# Patient Record
Sex: MALE | Race: BLACK OR AFRICAN AMERICAN | URBAN - METROPOLITAN AREA
[De-identification: names, ages, dates, MRNs, and addresses within clinical notes are randomized per-mention and may not be internally consistent; named-entity substitution may affect disease eponyms.]

---

## 2019-03-31 ENCOUNTER — EMERGENCY (EMERGENCY)
Facility: HOSPITAL | Age: 54
LOS: 1 days | Discharge: HOME | End: 2019-03-31
Admitting: EMERGENCY MEDICINE
Payer: COMMERCIAL

## 2019-03-31 VITALS
SYSTOLIC BLOOD PRESSURE: 145 MMHG | RESPIRATION RATE: 18 BRPM | HEART RATE: 100 BPM | TEMPERATURE: 98 F | WEIGHT: 291.01 LBS | DIASTOLIC BLOOD PRESSURE: 89 MMHG | HEIGHT: 70 IN | OXYGEN SATURATION: 97 %

## 2019-03-31 PROCEDURE — 73562 X-RAY EXAM OF KNEE 3: CPT | Mod: 26,50

## 2019-03-31 PROCEDURE — 73562 X-RAY EXAM OF KNEE 3: CPT

## 2019-03-31 PROCEDURE — 99283 EMERGENCY DEPT VISIT LOW MDM: CPT | Mod: 25

## 2019-03-31 RX ORDER — KETOROLAC TROMETHAMINE 30 MG/ML
60 SYRINGE (ML) INJECTION ONCE
Qty: 0 | Refills: 0 | Status: DISCONTINUED | OUTPATIENT
Start: 2019-03-31 | End: 2019-03-31

## 2019-03-31 RX ADMIN — Medication 60 MILLIGRAM(S): at 12:20

## 2019-03-31 NOTE — ED PROVIDER NOTE - CLINICAL SUMMARY MEDICAL DECISION MAKING FREE TEXT BOX
52 yo m with no pmh c/o b/l knee pain x 6 months. Pain getting worse. L knee worse than R. Denies trauma. Pain worse when going fro msitting to standing and when walking for long periods of time. Takes tylenol occasionally. Denies fever, chills, swelling, redness, calf pain. +tenderness to b/l knees, FROM, L knee + crepitus, no erythema, warmth or swelling, no calf swelling or tenderness 52 yo m with no pmh c/o b/l knee pain x 6 months. Pain getting worse. L knee worse than R. Denies trauma. Pain worse when going fro msitting to standing and when walking for long periods of time. Takes tylenol occasionally. Denies fever, chills, swelling, redness, calf pain. +tenderness to b/l knees, FROM, L knee + crepitus, no erythema, warmth or swelling, no calf swelling or tenderness. XR shows joint space narrowing, likely OA, will refer to ortho

## 2019-03-31 NOTE — ED ADULT TRIAGE NOTE - CHIEF COMPLAINT QUOTE
"I have been having pain to both my knees for about 6 months but it is so much worse now and my left hurts more".

## 2019-03-31 NOTE — ED PROVIDER NOTE - CARE PROVIDER_API CALL
Obinna Wolfe)  Orthopaedic Surgery  [address] [place] [address]  Phone: [phone]  Fax: [phone]  Follow Up Time:     Ricky Perez)  Orthopaedic Surgery  [address], [place] [address]  Phone: [phone]  Fax: [phone]  Follow Up Time:

## 2019-03-31 NOTE — ED PROVIDER NOTE - PHYSICAL EXAMINATION
CONSTITUTIONAL: Well-appearing; well-nourished; in no apparent distress.   HEAD: Normocephalic; atraumatic.   EYES: PERRL; EOM intact; conjunctiva and sclera clear  ENT: normal nose; no rhinorrhea; normal pharynx with no erythema or lesions.   NECK: Supple; non-tender;   CARDIOVASCULAR: Normal S1, S2; no murmurs, rubs, or gallops. Regular rate and rhythm.   RESPIRATORY: Breathing easily; breath sounds clear and equal bilaterally; no wheezes, rhonchi, or rales.  MSK: +tenderness to b/l knees, FROM, L knee + crepitus, no erythema, warmth or swelling, no calf swelling or tenderness   EXT: No cyanosis or edema; N/V intact  SKIN: Normal for age and race; warm; dry; good turgor; no apparent lesions or rash.

## 2019-03-31 NOTE — ED PROVIDER NOTE - OBJECTIVE STATEMENT
52 yo m with no pmh c/o b/l knee pain x 6 months. Pain getting worse. L knee worse than R. Denies trauma. Pain worse when going fro msitting to standing and when walking for long periods of time. Takes tylenol occasionally. Denies fever, chills, swelling, redness, calf pain.

## 2019-03-31 NOTE — ED PROVIDER NOTE - NSFOLLOWUPINSTRUCTIONS_ED_ALL_ED_FT
Osteoarthritis    WHAT YOU NEED TO KNOW:    Osteoarthritis (OA) occurs when cartilage (tissue that cushions a joint) wears away slowly and causes the bones to rub together. OA is a long-term condition that often affects the hands, neck, lower back, knees, and hips. OA is also called arthrosis or degenerative joint disease.    DISCHARGE INSTRUCTIONS:    Call your doctor or specialist if:     You have severe pain.      You cannot move your joint.      You have a fever.      Your joint is red and tender.      You have questions or concerns about your condition or care.    Medicines: You may need any of the following:     Acetaminophen decreases pain and fever. It is available without a doctor's order. Ask how much to take and how often to take it. Follow directions. Read the labels of all other medicines you are using to see if they also contain acetaminophen, or ask your doctor or pharmacist. Acetaminophen can cause liver damage if not taken correctly. Do not use more than 4 grams (4,000 milligrams) total of acetaminophen in one day.       NSAIDs, such as ibuprofen, help decrease swelling, pain, and fever. This medicine is available with or without a doctor's order. NSAIDs can cause stomach bleeding or kidney problems in certain people. If you take blood thinner medicine, always ask your healthcare provider if NSAIDs are safe for you. Always read the medicine label and follow directions.      Capsaicin cream may help decrease pain in your joint.       Prescription pain medicine may be given. Ask your healthcare provider how to take this medicine safely. Some prescription pain medicines contain acetaminophen. Do not take other medicines that contain acetaminophen without talking to your healthcare provider. Too much acetaminophen may cause liver damage. Prescription pain medicine may cause constipation. Ask your healthcare provider how to prevent or treat constipation.       Take your medicine as directed. Contact your healthcare provider if you think your medicine is not helping or if you have side effects. Tell him or her if you are allergic to any medicine. Keep a list of the medicines, vitamins, and herbs you take. Include the amounts, and when and why you take them. Bring the list or the pill bottles to follow-up visits. Carry your medicine list with you in case of an emergency.    Follow up with your healthcare provider as directed: Write down your questions so you remember to ask them during your visits.    Go to physical therapy as directed: A physical therapist teaches you exercises to help improve movement and strength, and to decrease pain in your joints. The exercises also help lower your risk for loss of function. A physical therapist may move an area with his or her hands. For example, he or she may move your leg in certain ways to treat osteoarthritis in your hip.    Manage your symptoms:     Stay active. Physical activity may reduce your pain and improve your ability to do daily activities. Avoid activities that cause pain. Ask your healthcare provider what type of exercise would be best for you.      Maintain a healthy weight. This helps decrease the strain on the joints in your back, hips, knees, ankles, and feet. Ask your healthcare provider what a healthy weight is for you. He or she can help you create a weight loss plan if you are overweight.      Use heat or ice on your joints as directed. Heat and ice help decrease pain, swelling, and muscle spasms. For heat, use a heating pad on a low setting for 20 minutes, or take a warm bath. For ice, use an ice pack, or put crushed ice in a plastic bag. Cover it with a towel before you place it on your joint. Use ice for 15 minutes every hour.      Massage the muscles around the joint. Massage helps relieve pain and stiffness. Your healthcare provider or a physical therapist can show you how to do this. If you have hip OA, another person may need to help you massage the area.      Use a cane, crutches, or a walker if directed. These help protect and relieve pressure on your ankle, knee, and hip joints. You may also be prescribed shoe inserts to decrease pressure in your joints.      Wear flat or low-heeled shoes. This will help decrease pain and reduce pressure on your ankle, knee, and hip joints.

## 2019-03-31 NOTE — ED PROVIDER NOTE - DIAGNOSTIC INTERPRETATION
ER PA: Leora Patel  b/vadim knee INTERPRETATION:  no acute fracture; no soft tissue swelling noted; + joint space narrowing

## 2019-04-04 DIAGNOSIS — M25.561 PAIN IN RIGHT KNEE: ICD-10-CM

## 2019-04-04 DIAGNOSIS — M25.562 PAIN IN LEFT KNEE: ICD-10-CM
